# Patient Record
(demographics unavailable — no encounter records)

---

## 2025-05-13 NOTE — PLAN
[FreeTextEntry1] : The potential side effects of Lo Loestrin were reviewed with her. She will start the medication and was advised to take it with food. She was also advised not to rely on it for contraception for the first month of treatment. She was also advised to use condoms to prevent HIV and sexually transmitted infections. She will follow up in August for her well visit.

## 2025-05-13 NOTE — HISTORY OF PRESENT ILLNESS
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  Her menses are regular.